# Patient Record
Sex: MALE | Race: WHITE | URBAN - METROPOLITAN AREA
[De-identification: names, ages, dates, MRNs, and addresses within clinical notes are randomized per-mention and may not be internally consistent; named-entity substitution may affect disease eponyms.]

---

## 2017-12-14 ENCOUNTER — IMPORTED ENCOUNTER (OUTPATIENT)
Dept: URBAN - METROPOLITAN AREA CLINIC 50 | Facility: CLINIC | Age: 72
End: 2017-12-14

## 2017-12-19 ENCOUNTER — IMPORTED ENCOUNTER (OUTPATIENT)
Dept: URBAN - METROPOLITAN AREA CLINIC 50 | Facility: CLINIC | Age: 72
End: 2017-12-19

## 2018-01-19 ENCOUNTER — IMPORTED ENCOUNTER (OUTPATIENT)
Dept: URBAN - METROPOLITAN AREA CLINIC 50 | Facility: CLINIC | Age: 73
End: 2018-01-19

## 2018-03-29 ENCOUNTER — IMPORTED ENCOUNTER (OUTPATIENT)
Dept: URBAN - METROPOLITAN AREA CLINIC 50 | Facility: CLINIC | Age: 73
End: 2018-03-29

## 2018-05-24 ENCOUNTER — IMPORTED ENCOUNTER (OUTPATIENT)
Dept: URBAN - METROPOLITAN AREA CLINIC 50 | Facility: CLINIC | Age: 73
End: 2018-05-24

## 2018-09-19 ENCOUNTER — IMPORTED ENCOUNTER (OUTPATIENT)
Dept: URBAN - METROPOLITAN AREA CLINIC 50 | Facility: CLINIC | Age: 73
End: 2018-09-19

## 2018-12-11 ENCOUNTER — IMPORTED ENCOUNTER (OUTPATIENT)
Dept: URBAN - METROPOLITAN AREA CLINIC 50 | Facility: CLINIC | Age: 73
End: 2018-12-11

## 2018-12-14 ENCOUNTER — IMPORTED ENCOUNTER (OUTPATIENT)
Dept: URBAN - METROPOLITAN AREA CLINIC 50 | Facility: CLINIC | Age: 73
End: 2018-12-14

## 2018-12-14 NOTE — PATIENT DISCUSSION
"""Initial start of Xiidra ophthalmic emulsion for dry eye management.  Pretreat with FML BID for 2 ""

## 2019-01-03 ENCOUNTER — IMPORTED ENCOUNTER (OUTPATIENT)
Dept: URBAN - METROPOLITAN AREA CLINIC 50 | Facility: CLINIC | Age: 74
End: 2019-01-03

## 2019-01-04 ENCOUNTER — IMPORTED ENCOUNTER (OUTPATIENT)
Dept: URBAN - METROPOLITAN AREA CLINIC 50 | Facility: CLINIC | Age: 74
End: 2019-01-04

## 2019-01-08 ENCOUNTER — IMPORTED ENCOUNTER (OUTPATIENT)
Dept: URBAN - METROPOLITAN AREA CLINIC 50 | Facility: CLINIC | Age: 74
End: 2019-01-08

## 2019-01-08 NOTE — PATIENT DISCUSSION
"""S/P IOL OS: Carli LLAMAS ZLB00 23.50 +ORA/Femto/Arcs +TM/Omidria +iStent inject. Continue post operative instructions and drops per schedule.  Continue glaucoma drops as prescribed."""

## 2019-01-18 ENCOUNTER — IMPORTED ENCOUNTER (OUTPATIENT)
Dept: URBAN - METROPOLITAN AREA CLINIC 50 | Facility: CLINIC | Age: 74
End: 2019-01-18

## 2019-01-18 NOTE — PATIENT DISCUSSION
"""Phaco with IOL OD: 02/04/2019 Carli LLAMAS ZKB00 +23.50 Target: [facility]

## 2019-02-04 ENCOUNTER — IMPORTED ENCOUNTER (OUTPATIENT)
Dept: URBAN - METROPOLITAN AREA CLINIC 50 | Facility: CLINIC | Age: 74
End: 2019-02-04

## 2019-02-04 NOTE — PATIENT DISCUSSION
"""S/P IOL OD: Tecnis MF ZKB00 +23.50 (Target: [place]) +Femto/Arcs +TM/Omidria +iStent inject. Continue post operative instructions and drops per schedule.  Continue glaucoma drops as prescribed."""

## 2019-02-14 ENCOUNTER — IMPORTED ENCOUNTER (OUTPATIENT)
Dept: URBAN - METROPOLITAN AREA CLINIC 50 | Facility: CLINIC | Age: 74
End: 2019-02-14

## 2019-02-14 NOTE — PATIENT DISCUSSION
"""S/P IOL OD: Tecnis MF ZKB00 +23.50 (Target: [place]) +Femto/Arcs +TM/Omidria +iStent inject. Continue post operative instructions and drops per schedule.  Continue glaucoma drops as prescribed."""

## 2019-03-14 ENCOUNTER — IMPORTED ENCOUNTER (OUTPATIENT)
Dept: URBAN - METROPOLITAN AREA CLINIC 50 | Facility: CLINIC | Age: 74
End: 2019-03-14

## 2019-11-20 ENCOUNTER — IMPORTED ENCOUNTER (OUTPATIENT)
Dept: URBAN - METROPOLITAN AREA CLINIC 50 | Facility: CLINIC | Age: 74
End: 2019-11-20

## 2020-03-13 ENCOUNTER — IMPORTED ENCOUNTER (OUTPATIENT)
Dept: URBAN - METROPOLITAN AREA CLINIC 50 | Facility: CLINIC | Age: 75
End: 2020-03-13

## 2020-03-13 NOTE — PATIENT DISCUSSION
"""Patient did not renew Restasis perscription. "" ""Continue Artificial tears both eyes two - four times a day ."" ""Restart Restasis both eyes twice a day . """

## 2020-03-16 ENCOUNTER — IMPORTED ENCOUNTER (OUTPATIENT)
Dept: URBAN - METROPOLITAN AREA CLINIC 50 | Facility: CLINIC | Age: 75
End: 2020-03-16

## 2020-05-19 ENCOUNTER — IMPORTED ENCOUNTER (OUTPATIENT)
Dept: URBAN - METROPOLITAN AREA CLINIC 50 | Facility: CLINIC | Age: 75
End: 2020-05-19

## 2020-05-21 ENCOUNTER — IMPORTED ENCOUNTER (OUTPATIENT)
Dept: URBAN - METROPOLITAN AREA CLINIC 50 | Facility: CLINIC | Age: 75
End: 2020-05-21

## 2021-02-25 ENCOUNTER — IMPORTED ENCOUNTER (OUTPATIENT)
Dept: URBAN - METROPOLITAN AREA CLINIC 50 | Facility: CLINIC | Age: 76
End: 2021-02-25

## 2021-03-01 ENCOUNTER — IMPORTED ENCOUNTER (OUTPATIENT)
Dept: URBAN - METROPOLITAN AREA CLINIC 50 | Facility: CLINIC | Age: 76
End: 2021-03-01

## 2021-03-04 ENCOUNTER — IMPORTED ENCOUNTER (OUTPATIENT)
Dept: URBAN - METROPOLITAN AREA CLINIC 50 | Facility: CLINIC | Age: 76
End: 2021-03-04

## 2021-04-17 ASSESSMENT — TONOMETRY
OS_IOP_MMHG: 13
OS_IOP_MMHG: 15
OD_IOP_MMHG: 15
OS_IOP_MMHG: 16
OD_IOP_MMHG: 17
OD_IOP_MMHG: 15
OD_IOP_MMHG: 35
OS_IOP_MMHG: 15
OD_IOP_MMHG: 14
OS_IOP_MMHG: 14
OS_IOP_MMHG: 17
OD_IOP_MMHG: 13
OD_IOP_MMHG: 14
OD_IOP_MMHG: 13
OD_IOP_MMHG: 16
OD_IOP_MMHG: 16
OS_IOP_MMHG: 14
OS_IOP_MMHG: 14
OD_IOP_MMHG: 14
OS_IOP_MMHG: 14
OD_IOP_MMHG: 16
OS_IOP_MMHG: 14
OS_IOP_MMHG: 16

## 2021-04-17 ASSESSMENT — VISUAL ACUITY
OD_BAT: 20/40
OD_BAT: 20/60
OD_PH: @ 17 IN
OS_CC: 20/40-
OS_OTHER: 20/70. 20/80.
OS_BAT: 20/70
OS_BAT: 20/25
OS_SC: 20/25-
OS_PH: @ 15 IN
OD_CC: J1+
OD_BAT: 20/40
OD_OTHER: 20/40. 20/50.
OD_SC: 20/20-1
OS_OTHER: 20/30. 20/30.
OS_SC: 20/25
OS_CC: 20/40-2
OD_PH: @ 16 IN
OD_CC: J1+@ 16 IN
OS_SC: 20/25
OD_SC: 20/20
OS_OTHER: 20/25. 20/30.
OD_OTHER: 20/40. 20/50.
OS_CC: J1+@ 13 IN
OD_CC: J1+@ 13 IN
OD_CC: J1+@ 16 IN
OD_SC: 20/20-2
OS_BAT: 20/70
OD_CC: 20/25
OS_SC: 20/20
OS_PH: @ 17 IN
OD_BAT: 20/40
OS_OTHER: 20/70. 20/70.
OS_CC: 20/30
OS_SC: 20/20
OD_BAT: 20/40
OD_SC: 20/100
OS_CC: J1+
OS_BAT: 20/30
OS_BAT: 20/70
OD_OTHER: 20/60. 20/80.
OD_SC: 20/20-
OS_CC: J1+@ 16 IN
OD_CC: 20/20-1
OS_CC: 20/80-2
OD_SC: 20/100
OS_OTHER: 20/70. 20/80.
OD_CC: J1+@ 17 IN
OS_CC: J1+
OS_OTHER: 20/70. 20/80.
OS_SC: 20/25+
OD_BAT: 20/40
OS_CC: J1+@ 17 IN
OS_CC: 20/400
OD_OTHER: 20/40. 20/50.
OS_CC: J1+@ 16 IN
OD_CC: 20/25
OS_BAT: 20/70
OD_CC: 20/20
OD_SC: 20/30
OS_PH: 20/40
OD_OTHER: 20/40. 20/50.
OD_CC: J1+
OD_OTHER: 20/40. 20/50.

## 2021-12-10 ENCOUNTER — PREPPED CHART (OUTPATIENT)
Dept: URBAN - METROPOLITAN AREA CLINIC 49 | Facility: CLINIC | Age: 76
End: 2021-12-10

## 2021-12-13 ENCOUNTER — FOLLOW UP (OUTPATIENT)
Dept: URBAN - METROPOLITAN AREA CLINIC 49 | Facility: CLINIC | Age: 76
End: 2021-12-13

## 2021-12-13 DIAGNOSIS — H47.233: ICD-10-CM

## 2021-12-13 DIAGNOSIS — H16.223: ICD-10-CM

## 2021-12-13 DIAGNOSIS — H40.013: ICD-10-CM

## 2021-12-13 DIAGNOSIS — H40.1131: ICD-10-CM

## 2021-12-13 PROCEDURE — 92133 CPTRZD OPH DX IMG PST SGM ON: CPT

## 2021-12-13 PROCEDURE — 92083 EXTENDED VISUAL FIELD XM: CPT

## 2021-12-13 PROCEDURE — 92012 INTRM OPH EXAM EST PATIENT: CPT

## 2021-12-13 ASSESSMENT — VISUAL ACUITY
OU_SC: 20/20
OS_SC: 20/20
OD_SC: 20/20

## 2021-12-13 ASSESSMENT — TONOMETRY
OD_IOP_MMHG: 14
OS_IOP_MMHG: 14

## 2022-01-04 ENCOUNTER — DIAGNOSTICS ONLY (OUTPATIENT)
Dept: URBAN - METROPOLITAN AREA CLINIC 48 | Facility: CLINIC | Age: 77
End: 2022-01-04

## 2022-01-04 DIAGNOSIS — H47.233: ICD-10-CM

## 2022-01-04 PROCEDURE — 92273 FULL FIELD ERG W/I&R: CPT

## 2022-06-10 ENCOUNTER — FOLLOW UP (OUTPATIENT)
Dept: URBAN - METROPOLITAN AREA CLINIC 53 | Facility: CLINIC | Age: 77
End: 2022-06-10

## 2022-06-10 DIAGNOSIS — H40.1131: ICD-10-CM

## 2022-06-10 DIAGNOSIS — H40.013: ICD-10-CM

## 2022-06-10 PROCEDURE — 92133 CPTRZD OPH DX IMG PST SGM ON: CPT

## 2022-06-10 PROCEDURE — 76514 ECHO EXAM OF EYE THICKNESS: CPT

## 2022-06-10 PROCEDURE — 92012 INTRM OPH EXAM EST PATIENT: CPT

## 2022-06-10 ASSESSMENT — TONOMETRY
OD_IOP_MMHG: 13
OD_IOP_MMHG: 15
OS_IOP_MMHG: 14
OS_IOP_MMHG: 12

## 2022-06-10 ASSESSMENT — VISUAL ACUITY
OS_SC: 20/25+2
OD_SC: 20/20-2
OU_SC: J1
OU_SC: 20/20-2

## 2022-06-10 ASSESSMENT — PACHYMETRY
OD_CT_UM: 580
OS_CT_UM: 572

## 2022-06-10 NOTE — PATIENT DISCUSSION
all Scans normal today on visit. Will see patient back in six months for return complete and visual field.

## 2023-01-16 ENCOUNTER — DIAGNOSTICS ONLY (OUTPATIENT)
Dept: URBAN - METROPOLITAN AREA CLINIC 49 | Facility: CLINIC | Age: 78
End: 2023-01-16

## 2023-01-16 DIAGNOSIS — H40.1131: ICD-10-CM

## 2023-01-16 PROCEDURE — 92133 CPTRZD OPH DX IMG PST SGM ON: CPT

## 2023-01-16 PROCEDURE — 92083 EXTENDED VISUAL FIELD XM: CPT

## 2023-01-26 ENCOUNTER — COMPREHENSIVE EXAM (OUTPATIENT)
Dept: URBAN - METROPOLITAN AREA CLINIC 53 | Facility: CLINIC | Age: 78
End: 2023-01-26

## 2023-01-26 DIAGNOSIS — H26.492: ICD-10-CM

## 2023-01-26 DIAGNOSIS — H16.223: ICD-10-CM

## 2023-01-26 DIAGNOSIS — H40.1131: ICD-10-CM

## 2023-01-26 PROCEDURE — 92133 CPTRZD OPH DX IMG PST SGM ON: CPT

## 2023-01-26 PROCEDURE — 92014 COMPRE OPH EXAM EST PT 1/>: CPT

## 2023-01-26 PROCEDURE — 66821 AFTER CATARACT LASER SURGERY: CPT

## 2023-01-26 PROCEDURE — 92083 EXTENDED VISUAL FIELD XM: CPT

## 2023-01-26 ASSESSMENT — TONOMETRY
OD_IOP_MMHG: 14
OS_IOP_MMHG: 12
OS_IOP_MMHG: 14
OD_IOP_MMHG: 12

## 2023-01-26 ASSESSMENT — VISUAL ACUITY
OD_GLARE: 20/60
OD_SC: 20/20
OS_GLARE: 20/200
OS_SC: 20/25
OU_SC: J1
OS_GLARE: 20/100
OD_GLARE: 20/40

## 2023-01-26 NOTE — PROCEDURE NOTE: CLINICAL
PROCEDURE NOTE: YAG Capsulotomy OS. Diagnosis: Posterior Capsular Opacification (PCO). Prep: Mydriacil 1% and Phenylephrine 2.5%. Prior to treatment, the risks/benefits/alternatives were discussed. The patient wished to proceed with procedure. Consent was signed. Proparacaine and brominidine were placed into the operative eye after the eye was dilated. Power = 3.0mJ. Number of pulses = 19. Patient tolerated procedure well and there were no complications. Post Laser instructions given. Elzbieta Art

## 2023-01-26 NOTE — PATIENT DISCUSSION
yag cap OS done today in office. Patient advised that he will notice floaters after procedure but this is normal. F/U in 1 year for HVF and RNFL.

## 2023-07-21 ENCOUNTER — OFFICE VISIT (OUTPATIENT)
Age: 78
End: 2023-07-21
Payer: MEDICARE

## 2023-07-21 VITALS
SYSTOLIC BLOOD PRESSURE: 148 MMHG | BODY MASS INDEX: 30.8 KG/M2 | DIASTOLIC BLOOD PRESSURE: 70 MMHG | WEIGHT: 220 LBS | HEIGHT: 71 IN | OXYGEN SATURATION: 98 % | HEART RATE: 68 BPM

## 2023-07-21 DIAGNOSIS — Z86.010 HISTORY OF COLON POLYPS: Primary | ICD-10-CM

## 2023-07-21 PROCEDURE — 99202 OFFICE O/P NEW SF 15 MIN: CPT | Performed by: COLON & RECTAL SURGERY

## 2023-07-21 RX ORDER — VALACYCLOVIR HYDROCHLORIDE 1 G/1
1000 TABLET, FILM COATED ORAL DAILY
COMMUNITY

## 2023-07-21 RX ORDER — MAGNESIUM 200 MG
250 TABLET ORAL
COMMUNITY

## 2023-07-21 RX ORDER — MAG HYDROX/ALUMINUM HYD/SIMETH 400-400-40
450 SUSPENSION, ORAL (FINAL DOSE FORM) ORAL
COMMUNITY

## 2023-07-21 RX ORDER — FINASTERIDE 5 MG/1
TABLET, FILM COATED ORAL
COMMUNITY
Start: 2023-07-12

## 2023-07-21 RX ORDER — SODIUM PICOSULFATE, MAGNESIUM OXIDE, AND ANHYDROUS CITRIC ACID 12; 3.5; 1 G/175ML; G/175ML; MG/175ML
LIQUID ORAL
Qty: 350 ML | Refills: 0 | Status: SHIPPED | OUTPATIENT
Start: 2023-07-21

## 2023-07-21 RX ORDER — UBIDECARENONE 100 MG
CAPSULE ORAL
COMMUNITY

## 2023-07-21 RX ORDER — SACCHAROMYCES BOULARDII 250 MG
CAPSULE ORAL
COMMUNITY

## 2023-07-21 RX ORDER — ASCORBIC ACID 500 MG
TABLET ORAL EVERY 24 HOURS
COMMUNITY

## 2023-07-21 RX ORDER — CRANBERRY FRUIT EXTRACT 200 MG
600 CAPSULE ORAL
COMMUNITY

## 2023-07-21 RX ORDER — AMOXICILLIN 500 MG/1
CAPSULE ORAL
COMMUNITY
Start: 2023-06-15

## 2023-07-21 NOTE — PATIENT INSTRUCTIONS
Scheduled date of colonoscopy (as of today): 7/24/23  Physician performing colonoscopy: Adele  Location of colonoscopy: [facility]  Bowel prep reviewed with patient: Clenpiq  Instructions reviewed with patient by: Flora FERRER  Clearances:

## 2023-07-21 NOTE — PROGRESS NOTES
Assessment/Plan:  History of colon and rectal polyps    Plan:    Surveillance colonoscopy      No problem-specific Assessment & Plan notes found for this encounter. Diagnoses and all orders for this visit:    History of colon polyps  -     Colonoscopy; Future    Other orders  -     amoxicillin (AMOXIL) 500 mg capsule; TAKE 4 CAPSULES BY MOUTH 1 HOUR PRIOR TO APPOINTMENT  -     Cholecalciferol 25 MCG (1000 UT) capsule; Take 25 mcg by mouth  -     co-enzyme Q-10 100 mg capsule; Take by mouth  -     CRANBERRY PO; Take by mouth  -     finasteride (PROSCAR) 5 mg tablet  -     Flaxseed, Linseed, (Flaxseed Oil) 1200 MG CAPS; Take by mouth  -     Magnesium 200 MG TABS; Take 250 mg by mouth  -     Red Yeast Rice Extract 600 MG CAPS; Take 600 mg by mouth  -     saccharomyces boulardii (FLORASTOR) 250 mg capsule; Take by mouth  -     Saw Palmetto 450 MG CAPS; Take 450 mg by mouth  -     valACYclovir (VALTREX) 1,000 mg tablet; Take 1,000 mg by mouth daily  -     Chelated Zinc 50 MG TABS; every 24 hours  -     Diet NPO; Sips with meds; Standing  -     Void on call to OR; Standing  -     Insert peripheral IV; Standing          Subjective:      Patient ID: Raffi Tavera is a 69 y.o. male. Patient is a 18-year-old male with past history of colon and rectal polyps last surveillance colonoscopy 2018. Patient presents today for scheduling surveillance colonoscopy. 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 Patient with remote history of atrial fibrillation status post cardioversion with establishment of sinus rhythm. Patient with remote history of nonspecific colitis causing diarrhea previous elevation and IBD panel Sarcomycoses cervasie .   Symptoms of diarrhea controlled with Entocort therapy      The following portions of the patient's history were reviewed and updated as appropriate: allergies, current medications, past family history, past medical history, past social history, past surgical history and problem list.    Review of Systems   Constitutional: Negative for chills and fever. HENT: Negative for ear pain and sore throat. Eyes: Negative for pain and visual disturbance. Respiratory: Negative for cough and shortness of breath. Cardiovascular: Negative for chest pain and palpitations. Gastrointestinal: Negative for abdominal pain and vomiting. Genitourinary: Negative for dysuria and hematuria. Musculoskeletal: Negative for arthralgias and back pain. Skin: Negative for color change and rash. Neurological: Negative for seizures and syncope. All other systems reviewed and are negative. Objective:      /70   Pulse 68   Ht 5' 11" (1.803 m)   Wt 99.8 kg (220 lb)   SpO2 98%   BMI 30.68 kg/m²          Physical Exam  Vitals and nursing note reviewed. Constitutional:       Appearance: Normal appearance. HENT:      Head: Normocephalic. Eyes:      Conjunctiva/sclera: Conjunctivae normal.   Cardiovascular:      Rate and Rhythm: Normal rate and regular rhythm. Heart sounds: Normal heart sounds. Pulmonary:      Effort: Pulmonary effort is normal.      Breath sounds: Normal breath sounds. Abdominal:      General: Bowel sounds are normal.      Palpations: Abdomen is soft. Musculoskeletal:      Cervical back: Neck supple. Skin:     General: Skin is warm and dry. Neurological:      Mental Status: He is alert and oriented to person, place, and time.    Psychiatric:         Behavior: Behavior normal.

## 2023-07-21 NOTE — H&P (VIEW-ONLY)
Assessment/Plan:  History of colon and rectal polyps    Plan:    Surveillance colonoscopy      No problem-specific Assessment & Plan notes found for this encounter. Diagnoses and all orders for this visit:    History of colon polyps  -     Colonoscopy; Future    Other orders  -     amoxicillin (AMOXIL) 500 mg capsule; TAKE 4 CAPSULES BY MOUTH 1 HOUR PRIOR TO APPOINTMENT  -     Cholecalciferol 25 MCG (1000 UT) capsule; Take 25 mcg by mouth  -     co-enzyme Q-10 100 mg capsule; Take by mouth  -     CRANBERRY PO; Take by mouth  -     finasteride (PROSCAR) 5 mg tablet  -     Flaxseed, Linseed, (Flaxseed Oil) 1200 MG CAPS; Take by mouth  -     Magnesium 200 MG TABS; Take 250 mg by mouth  -     Red Yeast Rice Extract 600 MG CAPS; Take 600 mg by mouth  -     saccharomyces boulardii (FLORASTOR) 250 mg capsule; Take by mouth  -     Saw Palmetto 450 MG CAPS; Take 450 mg by mouth  -     valACYclovir (VALTREX) 1,000 mg tablet; Take 1,000 mg by mouth daily  -     Chelated Zinc 50 MG TABS; every 24 hours  -     Diet NPO; Sips with meds; Standing  -     Void on call to OR; Standing  -     Insert peripheral IV; Standing          Subjective:      Patient ID: Harish Record is a 69 y.o. male. Patient is a 55-year-old male with past history of colon and rectal polyps last surveillance colonoscopy 2018. Patient presents today for scheduling surveillance colonoscopy. 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 Patient with remote history of atrial fibrillation status post cardioversion with establishment of sinus rhythm. Patient with remote history of nonspecific colitis causing diarrhea previous elevation and IBD panel Sarcomycoses cervasie .   Symptoms of diarrhea controlled with Entocort therapy      The following portions of the patient's history were reviewed and updated as appropriate: allergies, current medications, past family history, past medical history, past social history, past surgical history and problem list.    Review of Systems   Constitutional: Negative for chills and fever. HENT: Negative for ear pain and sore throat. Eyes: Negative for pain and visual disturbance. Respiratory: Negative for cough and shortness of breath. Cardiovascular: Negative for chest pain and palpitations. Gastrointestinal: Negative for abdominal pain and vomiting. Genitourinary: Negative for dysuria and hematuria. Musculoskeletal: Negative for arthralgias and back pain. Skin: Negative for color change and rash. Neurological: Negative for seizures and syncope. All other systems reviewed and are negative. Objective:      /70   Pulse 68   Ht 5' 11" (1.803 m)   Wt 99.8 kg (220 lb)   SpO2 98%   BMI 30.68 kg/m²          Physical Exam  Vitals and nursing note reviewed. Constitutional:       Appearance: Normal appearance. HENT:      Head: Normocephalic. Eyes:      Conjunctiva/sclera: Conjunctivae normal.   Cardiovascular:      Rate and Rhythm: Normal rate and regular rhythm. Heart sounds: Normal heart sounds. Pulmonary:      Effort: Pulmonary effort is normal.      Breath sounds: Normal breath sounds. Abdominal:      General: Bowel sounds are normal.      Palpations: Abdomen is soft. Musculoskeletal:      Cervical back: Neck supple. Skin:     General: Skin is warm and dry. Neurological:      Mental Status: He is alert and oriented to person, place, and time.    Psychiatric:         Behavior: Behavior normal.

## 2023-07-24 ENCOUNTER — HOSPITAL ENCOUNTER (OUTPATIENT)
Dept: GASTROENTEROLOGY | Facility: HOSPITAL | Age: 78
Setting detail: OUTPATIENT SURGERY
Discharge: HOME | End: 2023-07-24
Attending: COLON & RECTAL SURGERY | Admitting: COLON & RECTAL SURGERY
Payer: MEDICARE

## 2023-07-24 ENCOUNTER — ANESTHESIA EVENT (OUTPATIENT)
Dept: GASTROENTEROLOGY | Facility: HOSPITAL | Age: 78
End: 2023-07-24

## 2023-07-24 ENCOUNTER — ANESTHESIA (OUTPATIENT)
Dept: GASTROENTEROLOGY | Facility: HOSPITAL | Age: 78
End: 2023-07-24

## 2023-07-24 VITALS
HEART RATE: 52 BPM | DIASTOLIC BLOOD PRESSURE: 56 MMHG | TEMPERATURE: 97.8 F | OXYGEN SATURATION: 95 % | WEIGHT: 215.17 LBS | RESPIRATION RATE: 16 BRPM | SYSTOLIC BLOOD PRESSURE: 109 MMHG | BODY MASS INDEX: 30.12 KG/M2 | HEIGHT: 71 IN

## 2023-07-24 DIAGNOSIS — Z86.010 HISTORY OF COLON POLYPS: ICD-10-CM

## 2023-07-24 RX ORDER — ASPIRIN 325 MG
325 TABLET, DELAYED RELEASE (ENTERIC COATED) ORAL EVERY 6 HOURS PRN
COMMUNITY

## 2023-07-24 RX ORDER — SODIUM CHLORIDE, SODIUM LACTATE, POTASSIUM CHLORIDE, CALCIUM CHLORIDE 600; 310; 30; 20 MG/100ML; MG/100ML; MG/100ML; MG/100ML
INJECTION, SOLUTION INTRAVENOUS CONTINUOUS PRN
Status: DISCONTINUED | OUTPATIENT
Start: 2023-07-24 | End: 2023-07-24

## 2023-07-24 RX ORDER — LIDOCAINE HYDROCHLORIDE 10 MG/ML
INJECTION, SOLUTION EPIDURAL; INFILTRATION; INTRACAUDAL; PERINEURAL AS NEEDED
Status: DISCONTINUED | OUTPATIENT
Start: 2023-07-24 | End: 2023-07-24

## 2023-07-24 RX ORDER — PROPOFOL 10 MG/ML
INJECTION, EMULSION INTRAVENOUS AS NEEDED
Status: DISCONTINUED | OUTPATIENT
Start: 2023-07-24 | End: 2023-07-24

## 2023-07-24 RX ADMIN — PROPOFOL 20 MG: 10 INJECTION, EMULSION INTRAVENOUS at 09:05

## 2023-07-24 RX ADMIN — PROPOFOL 20 MG: 10 INJECTION, EMULSION INTRAVENOUS at 09:08

## 2023-07-24 RX ADMIN — PROPOFOL 20 MG: 10 INJECTION, EMULSION INTRAVENOUS at 09:01

## 2023-07-24 RX ADMIN — LIDOCAINE HYDROCHLORIDE 50 MG: 10 INJECTION, SOLUTION EPIDURAL; INFILTRATION; INTRACAUDAL at 08:48

## 2023-07-24 RX ADMIN — PROPOFOL 20 MG: 10 INJECTION, EMULSION INTRAVENOUS at 09:12

## 2023-07-24 RX ADMIN — PROPOFOL 20 MG: 10 INJECTION, EMULSION INTRAVENOUS at 08:49

## 2023-07-24 RX ADMIN — SODIUM CHLORIDE, SODIUM LACTATE, POTASSIUM CHLORIDE, AND CALCIUM CHLORIDE: .6; .31; .03; .02 INJECTION, SOLUTION INTRAVENOUS at 08:46

## 2023-07-24 RX ADMIN — PROPOFOL 20 MG: 10 INJECTION, EMULSION INTRAVENOUS at 08:56

## 2023-07-24 RX ADMIN — PROPOFOL 30 MG: 10 INJECTION, EMULSION INTRAVENOUS at 08:53

## 2023-07-24 RX ADMIN — PROPOFOL 30 MG: 10 INJECTION, EMULSION INTRAVENOUS at 08:51

## 2023-07-24 RX ADMIN — PROPOFOL 80 MG: 10 INJECTION, EMULSION INTRAVENOUS at 08:48

## 2023-07-24 NOTE — ANESTHESIA PREPROCEDURE EVALUATION
Procedure:  COLONOSCOPY    Relevant Problems   ANESTHESIA (within normal limits)      CARDIO   (+) Atrial fibrillation (HCC)      ENDO (within normal limits)      GI/HEPATIC (within normal limits)  NPO confirmed  BMI 30      /RENAL (within normal limits)      HEMATOLOGY (within normal limits)      PULMONARY   (-) URI (upper respiratory infection)      Allergies   Allergen Reactions   • Doxycycline Anaphylaxis     Social History     Tobacco Use   • Smoking status: Former     Types: Cigarettes     Quit date:      Years since quittin.5   • Smokeless tobacco: Never   Vaping Use   • Vaping Use: Never used   Substance Use Topics   • Alcohol use: Not Currently     Comment: 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 • Flaxseed, Linseed, (Flaxseed Oil) 1200 MG CAPS Oral   • Magnesium 250 mg, Oral   • Red Yeast Rice Extract 600 mg, Oral   • saccharomyces boulardii (FLORASTOR) 250 mg capsule Oral   • Saw Palmetto 450 mg, Oral   • sodium picosulfate, magnesium oxide, citric acid (Clenpiq) oral solution Take 175 mL (1 bottle) the evening before the colonoscopy, between 5 PM and 9 PM, followed by a second 175 mL bottle 5 hours before the colonoscopy.    • valACYclovir (VALTREX) 1,000 mg, Oral, Daily     No results found for: "WBC", "HGB", "HCT", "PLT", "SODIUM", "K", "CL", "CO2", "BUN", "CREATININE", "GLUC", "HGBA1C", "HBA1C", "AST", "ALT", "GGT", "ALKPHOS", "TBILI", "ALB", "PROTIME", "PTT", "INR"  Vitals:    23 0735   BP: 137/75   Pulse: 59   Resp: 17   Temp: 98.3 °F (36.8 °C) SpO2: 99%       Physical Exam    Airway    Mallampati score: II  TM Distance: >3 FB  Neck ROM: full     Dental   Comment: Denies loose/chipped teeth, No notable dental hx     Cardiovascular  Rhythm: regular, Rate: normal, Cardiovascular exam normal    Pulmonary  Pulmonary exam normal Breath sounds clear to auscultation,     Other Findings        Anesthesia Plan  ASA Score- 2     Anesthesia Type- IV sedation with anesthesia with ASA Monitors. Additional Monitors:   Airway Plan:     Comment: O2 mask, natural airway, EtCO2 monitor. Risks discussed including awareness, aspiration, drug reactions and conversion to GA. Noemy Louis Plan Factors-Exercise tolerance (METS): >4 METS. Chart reviewed. EKG reviewed. Existing labs reviewed. Patient summary reviewed. Patient is not a current smoker. Induction- intravenous. Postoperative Plan-     Informed Consent- Anesthetic plan and risks discussed with patient. I personally reviewed this patient with the CRNA. Discussed and agreed on the Anesthesia Plan with the CRNA. Noemy Louis

## 2023-07-24 NOTE — INTERVAL H&P NOTE
H&P reviewed. After examining the patient I find no changes in the patients condition since the H&P had been written.     Vitals:    07/24/23 0735   BP: 137/75   Pulse: 59   Resp: 17   Temp: 98.3 °F (36.8 °C)   SpO2: 99%

## 2023-07-24 NOTE — ANESTHESIA POSTPROCEDURE EVALUATION
Post-Op Assessment Note    CV Status:  Stable  Pain Score: 0    Pain management: adequate     Mental Status:  Alert and awake   Hydration Status:  Euvolemic   PONV Controlled:  Controlled   Airway Patency:  Patent and adequate      Post Op Vitals Reviewed: Yes      Staff: CRNA         No notable events documented.     BP 95/51 (07/24/23 0918)    Temp      Pulse (!) 52 (07/24/23 0918)   Resp 15 (07/24/23 0918)    SpO2 98 % (07/24/23 0918)

## 2023-10-24 ENCOUNTER — OFFICE VISIT (OUTPATIENT)
Dept: CARDIOLOGY CLINIC | Facility: CLINIC | Age: 78
End: 2023-10-24
Payer: MEDICARE

## 2023-10-24 VITALS
DIASTOLIC BLOOD PRESSURE: 88 MMHG | HEIGHT: 71 IN | HEART RATE: 70 BPM | OXYGEN SATURATION: 98 % | SYSTOLIC BLOOD PRESSURE: 148 MMHG | WEIGHT: 224 LBS | BODY MASS INDEX: 31.36 KG/M2 | RESPIRATION RATE: 16 BRPM

## 2023-10-24 DIAGNOSIS — Z92.89 HISTORY OF CARDIOVERSION: ICD-10-CM

## 2023-10-24 DIAGNOSIS — I65.23 ARTERIOSCLEROSIS OF BOTH CAROTID ARTERIES: ICD-10-CM

## 2023-10-24 DIAGNOSIS — I48.0 PAF (PAROXYSMAL ATRIAL FIBRILLATION) (HCC): Primary | ICD-10-CM

## 2023-10-24 PROCEDURE — 99204 OFFICE O/P NEW MOD 45 MIN: CPT | Performed by: INTERNAL MEDICINE

## 2023-10-24 RX ORDER — ASPIRIN 81 MG/1
81 TABLET, CHEWABLE ORAL DAILY
Start: 2023-10-24

## 2023-10-24 NOTE — PROGRESS NOTES
CARDIOLOGY INITIAL VISIT  [facility]  [address], [facility] [place]  [address] [place] [address], [address] [place] [address]  Tel: [phone]      NAME: Daniela Morales  AGE: 69 y.o. SEX: male  : 1945  MRN: [id number]      Chief Complaint:  Chief Complaint   Patient presents with    New Patient Visit         History of Present Illness:   71-year-old male with typical atrial flutter (May 2022) - atrial fibrillation (Aug 2022) s/p DCCV, bilateral carotid arteriosclerosis, PVD who stays in [address] [place] [address] for 6 months and in [place] for the next 6 months. His primary cardiologist is in [place] but he comes here to establish with our practice. States he is doing well from cardiac stand point and denies chest pain / pressure, SOB, palpitations, lightheadedness, syncope, swelling feet, orthopnea, PND, claudication. Patient was initially on Eliquis and Flecainide for his atrial flutter-fibrillation but the meds were discontinued after a 6 day monitor in May 2023 and patient was started on aspirin 81 mg daily. CHADS2-VASc = 2 (Age x 2). 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 Tamsulosin was changed to finasteride due to syncope. States since May 2022 he walks 12,000 steps daily and has lost 17 pounds of weight. 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 Went to hospital.  Diagnosed with low blood pressure (perhaps from tamsulosin) and bradycardia. Syncope was thought to be due to orthostatic hypotension, low BP, dehydration. Later tamsulosin was changed to finasteride    The echo 2022. EF 50-55%.      Past Medical History:  Past Medical History:   Diagnosis Date    Atrial fibrillation ([address])     Colitis     Colon polyps     HSV-2 infection     Rosacea     Seasonal allergies          Past Surgical History:  Past Surgical History:   Procedure Laterality Date    COLONOSCOPY  2018    REPLACEMENT TOTAL KNEE Right          Family History:  Family History   Problem Relation Age of Onset    COPD Mother     Cancer Mother         lung    Emphysema Mother     Cancer Father         bladder    Diabetes Father     Heart disease Father     Cancer Brother         vocal cord         Social History:  Social History     Socioeconomic History    Marital status: /Civil Union     Spouse name: None    Number of children: None    Years of education: None    Highest education level: None   Occupational History    None   Tobacco Use    Smoking status: Former     Types: Cigarettes     Quit date:      Years since quittin.8    Smokeless tobacco: Never   Vaping Use    Vaping Use: Never used   Substance and Sexual Activity    Alcohol use: Not Currently     Comment: 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    Drug use: Not Currently     Types: Marijuana     Comment:  patient stop smoking marjuana    Sexual activity: Not Currently     Partners: Female   Other Topics Concern    None   Social History Narrative    None     Social Determinants of Health     Financial Resource Strain: Not on file   Food Insecurity: Not on file   Transportation Needs: Not on file   Physical Activity: Not on file   Stress: Not on file   Social Connections: Not on file   Intimate Partner Violence: Not on file   Housing Stability: Not on file         Active Problems:  Patient Active Problem List   Diagnosis    Atrial fibrillation ([address])         The following portions of the patient's history were reviewed and updated as appropriate: past medical history, past surgical history, past family history,  past social history, current medications, allergies and problem list.      Review of Systems:  Constitutional: Denies fever, chills  Eyes: Denies eye redness, eye discharge  ENT: Denies hearing loss, sneezing, nasal discharge, sore throat Respiratory: Denies cough, expectoration, shortness of breath  Cardiovascular: Denies chest pain, palpitations, lower extremity swelling  Gastrointestinal: Denies abdominal pain, nausea, vomiting, diarrhea  Genito-Urinary: Denies dysuria, incontinence  Musculoskeletal: Denies back pain, joint pain, muscle pain  Neurologic: Denies lightheadedness, syncope, headache, seizures  Endocrine: Denies polydipsia, temperature intolerance  Allergy and Immunology: Denies hives, insect bite sensitivity  Hematological and Lymphatic: Denies bleeding problems, swollen glands   Psychological: Denies depression, suicidal ideation, anxiety, panic  Dermatological: Denies pruritus, rash, skin lesion changes      Vitals:  Vitals:    10/24/23 1003   BP: 148/88   Pulse: 70   Resp: 16   SpO2: 98%       Body mass index is 31.24 kg/m². Weight (last 2 days)       Date/Time Weight    10/24/23 1003 102 (224)              Physical Examination:  General: Patient is not in acute distress. Awake, alert, oriented in time, place and person. Responding to commands  Head: Normocephalic. Atraumatic  Eyes: Both pupils normal sized, round and reactive to light. Nonicteric  ENT: Normal external ear canals  Neck: Supple. JVP not raised. Trachea central. No thyromegaly  Lungs: Bilateral bronchovascular breath sounds with no crackles or rhonchi  Chest wall: No tenderness  Cardiovascular: RRR. S1 and S2 normal. No murmur, rub or gallop  Gastrointestinal: Abdomen soft, nontender. No guarding or rigidity. Liver and spleen not palpable. Bowel sounds present  Neurologic: Patient is awake, alert, oriented in time, place and person. Responding to commands. Moving all extremities  Integumentary:  No skin rash  Lymphatic: No cervical lymphadenopathy  Back: Symmetric.  No CVA tenderness  Extremities: No clubbing, cyanosis or edema      Medications:    Current Outpatient Medications:     aspirin 81 mg chewable tablet, Chew 1 tablet (81 mg total) daily, Disp: , Rfl: Chelated Zinc 50 MG TABS, every 24 hours, Disp: , Rfl:     Cholecalciferol 25 MCG (1000 UT) capsule, Take 25 mcg by mouth, Disp: , Rfl:     co-enzyme Q-10 100 mg capsule, Take by mouth, Disp: , Rfl:     CRANBERRY PO, Take by mouth, Disp: , Rfl:     cyanocobalamin (VITAMIN B-12) 100 MCG tablet, Take 100 mcg by mouth daily, Disp: , Rfl:     finasteride (PROSCAR) 5 mg tablet, , Disp: , Rfl:     Flaxseed, Linseed, (Flaxseed Oil) 1200 MG CAPS, Take by mouth, Disp: , Rfl:     Magnesium 200 MG TABS, Take 250 mg by mouth, Disp: , Rfl:     Red Yeast Rice Extract 600 MG CAPS, Take 600 mg by mouth, Disp: , Rfl:     saccharomyces boulardii (FLORASTOR) 250 mg capsule, Take by mouth, Disp: , Rfl:     Saw Palmetto 450 MG CAPS, Take 450 mg by mouth, Disp: , Rfl:     valACYclovir (VALTREX) 1,000 mg tablet, Take 1,000 mg by mouth daily, Disp: , Rfl:       Allergies: Allergies   Allergen Reactions    Doxycycline Anaphylaxis         Assessment and Plan:  1. Typical atrial flutter. Paroxysmal atrial fibrillation. S/p DCCV  Currently in sinus rhythm. On aspirin 81 mg daily. See comment above    2. Arteriosclerosis of both carotid arteries  On aspirin. Not on statin. Follows with his doctor in [place]  Denies symptoms suggestive of TIA, CVA, amaurosis    Recommend aggressive risk factor modification and therapeutic lifestyle changes. Low-salt, low-calorie, low-fat, low-cholesterol diet with regular exercise and to optimize weight. I will defer the ordering and monitoring of necessity lab studies to you, but I am available and happy to review and manage any of the data at your request in the future. Discussed concepts of atherosclerosis, including signs and symptoms of cardiac disease. Previous studies were reviewed. Safety measures were reviewed. Questions were entertained and answered. Patient was advised to report any problems requiring medical attention.     Follow-up with PCP and appropriate specialist and lab work as discussed. Return for follow up visit as scheduled or earlier, if needed. Thank you for allowing me to participate in the care and evaluation of your patient. Should you have any questions, please feel free to contact me.       Brooke Reyes MD  10/24/2023,10:46 AM

## 2024-03-01 ENCOUNTER — FOLLOW UP (OUTPATIENT)
Dept: URBAN - METROPOLITAN AREA CLINIC 53 | Facility: CLINIC | Age: 79
End: 2024-03-01

## 2024-03-01 DIAGNOSIS — H40.1131: ICD-10-CM

## 2024-03-01 PROCEDURE — 92133 CPTRZD OPH DX IMG PST SGM ON: CPT

## 2024-03-01 PROCEDURE — 99214 OFFICE O/P EST MOD 30 MIN: CPT

## 2024-03-01 PROCEDURE — 92083 EXTENDED VISUAL FIELD XM: CPT

## 2024-03-01 ASSESSMENT — VISUAL ACUITY
OS_SC: 20/20-1
OU_SC: J1+-1@16"
OD_SC: 20/20

## 2024-03-01 ASSESSMENT — TONOMETRY
OS_IOP_MMHG: 12
OD_IOP_MMHG: 14
OD_IOP_MMHG: 12
OS_IOP_MMHG: 14

## 2024-06-26 ENCOUNTER — TELEPHONE (OUTPATIENT)
Age: 79
End: 2024-06-26

## 2024-06-26 DIAGNOSIS — Z12.11 ENCOUNTER FOR SCREENING COLONOSCOPY: Primary | ICD-10-CM

## 2024-07-16 ENCOUNTER — APPOINTMENT (OUTPATIENT)
Age: 79
End: 2024-07-16
Payer: MEDICARE

## 2024-07-16 PROCEDURE — G0328 FECAL BLOOD SCRN IMMUNOASSAY: HCPCS

## 2024-07-24 ENCOUNTER — APPOINTMENT (OUTPATIENT)
Age: 79
End: 2024-07-24
Payer: MEDICARE

## 2024-07-24 DIAGNOSIS — R35.0 URINARY FREQUENCY: ICD-10-CM

## 2024-07-24 DIAGNOSIS — N40.1 ENLARGED PROSTATE WITH URINARY OBSTRUCTION: ICD-10-CM

## 2024-07-24 DIAGNOSIS — N13.8 ENLARGED PROSTATE WITH URINARY OBSTRUCTION: ICD-10-CM

## 2024-07-24 DIAGNOSIS — R39.15 URGENCY OF URINATION: ICD-10-CM

## 2024-07-24 DIAGNOSIS — R35.1 NOCTURIA: ICD-10-CM

## 2024-07-24 LAB — PSA SERPL-MCNC: 0.28 NG/ML (ref 0–4)

## 2024-07-24 PROCEDURE — 82670 ASSAY OF TOTAL ESTRADIOL: CPT

## 2024-07-24 PROCEDURE — 36415 COLL VENOUS BLD VENIPUNCTURE: CPT

## 2024-07-24 PROCEDURE — G0103 PSA SCREENING: HCPCS

## 2024-07-25 ENCOUNTER — APPOINTMENT (OUTPATIENT)
Age: 79
End: 2024-07-25
Payer: MEDICARE

## 2024-07-25 DIAGNOSIS — R35.0 URINARY FREQUENCY: ICD-10-CM

## 2024-07-25 DIAGNOSIS — R35.1 NOCTURIA: ICD-10-CM

## 2024-07-25 DIAGNOSIS — N40.1 ENLARGED PROSTATE WITH URINARY OBSTRUCTION: ICD-10-CM

## 2024-07-25 DIAGNOSIS — N13.8 ENLARGED PROSTATE WITH URINARY OBSTRUCTION: ICD-10-CM

## 2024-07-25 DIAGNOSIS — R39.15 URGENCY OF URINATION: ICD-10-CM

## 2024-07-25 PROCEDURE — 84403 ASSAY OF TOTAL TESTOSTERONE: CPT

## 2024-07-25 PROCEDURE — 36415 COLL VENOUS BLD VENIPUNCTURE: CPT

## 2024-07-25 PROCEDURE — 84402 ASSAY OF FREE TESTOSTERONE: CPT

## 2024-07-26 LAB
ESTRADIOL SERPL-MCNC: 33.3 PG/ML (ref 7.6–42.6)
TESTOST FREE SERPL-MCNC: 5.7 PG/ML (ref 6.6–18.1)
TESTOST SERPL-MCNC: 879 NG/DL (ref 264–916)

## 2025-03-03 ENCOUNTER — COMPREHENSIVE EXAM (OUTPATIENT)
Age: 80
End: 2025-03-03

## 2025-03-03 DIAGNOSIS — H40.1131: ICD-10-CM

## 2025-03-03 DIAGNOSIS — H16.223: ICD-10-CM

## 2025-03-03 PROCEDURE — 92133 CPTRZD OPH DX IMG PST SGM ON: CPT

## 2025-03-03 PROCEDURE — 99214 OFFICE O/P EST MOD 30 MIN: CPT
